# Patient Record
Sex: MALE | Race: WHITE | URBAN - NONMETROPOLITAN AREA
[De-identification: names, ages, dates, MRNs, and addresses within clinical notes are randomized per-mention and may not be internally consistent; named-entity substitution may affect disease eponyms.]

---

## 2018-02-11 ENCOUNTER — HOSPITAL ENCOUNTER (EMERGENCY)
Facility: OTHER | Age: 83
Discharge: HOME | End: 2018-02-11
Attending: FAMILY MEDICINE | Admitting: FAMILY MEDICINE
Payer: MEDICARE

## 2018-02-11 VITALS
HEIGHT: 70 IN | DIASTOLIC BLOOD PRESSURE: 103 MMHG | RESPIRATION RATE: 14 BRPM | TEMPERATURE: 96.5 F | WEIGHT: 160 LBS | SYSTOLIC BLOOD PRESSURE: 176 MMHG | OXYGEN SATURATION: 96 % | HEART RATE: 74 BPM | BODY MASS INDEX: 22.9 KG/M2

## 2018-02-11 DIAGNOSIS — M62.81 MUSCLE WEAKNESS (GENERALIZED): ICD-10-CM

## 2018-02-11 LAB
ANION GAP SERPL CALCULATED.3IONS-SCNC: 8 MMOL/L (ref 3–14)
BASOPHILS # BLD AUTO: 0 10E9/L (ref 0–0.2)
BASOPHILS NFR BLD AUTO: 0.5 %
BUN SERPL-MCNC: 12 MG/DL (ref 7–25)
CALCIUM SERPL-MCNC: 9.2 MG/DL (ref 8.6–10.3)
CHLORIDE SERPL-SCNC: 102 MMOL/L (ref 98–107)
CO2 SERPL-SCNC: 28 MMOL/L (ref 21–31)
CREAT SERPL-MCNC: 1.08 MG/DL (ref 0.7–1.3)
DIFFERENTIAL METHOD BLD: ABNORMAL
EOSINOPHIL # BLD AUTO: 0.2 10E9/L (ref 0–0.7)
EOSINOPHIL NFR BLD AUTO: 2.7 %
ERYTHROCYTE [DISTWIDTH] IN BLOOD BY AUTOMATED COUNT: 12.1 % (ref 10–15)
GFR SERPL CREATININE-BSD FRML MDRD: 65 ML/MIN/1.7M2
GLUCOSE SERPL-MCNC: 106 MG/DL (ref 70–105)
HCT VFR BLD AUTO: 44.4 % (ref 40–53)
HGB BLD-MCNC: 15.6 G/DL (ref 13.3–17.7)
IMM GRANULOCYTES # BLD: 0 10E9/L (ref 0–0.4)
IMM GRANULOCYTES NFR BLD: 0.3 %
LYMPHOCYTES # BLD AUTO: 1.3 10E9/L (ref 0.8–5.3)
LYMPHOCYTES NFR BLD AUTO: 16.1 %
MCH RBC QN AUTO: 33.3 PG (ref 26.5–33)
MCHC RBC AUTO-ENTMCNC: 35.1 G/DL (ref 31.5–36.5)
MCV RBC AUTO: 95 FL (ref 78–100)
MONOCYTES # BLD AUTO: 1 10E9/L (ref 0–1.3)
MONOCYTES NFR BLD AUTO: 13.2 %
NEUTROPHILS # BLD AUTO: 5.3 10E9/L (ref 1.6–8.3)
NEUTROPHILS NFR BLD AUTO: 67.2 %
PLATELET # BLD AUTO: 288 10E9/L (ref 150–450)
POTASSIUM SERPL-SCNC: 4 MMOL/L (ref 3.5–5.1)
RBC # BLD AUTO: 4.68 10E12/L (ref 4.4–5.9)
SODIUM SERPL-SCNC: 138 MMOL/L (ref 134–144)
WBC # BLD AUTO: 7.9 10E9/L (ref 4–11)

## 2018-02-11 PROCEDURE — 80048 BASIC METABOLIC PNL TOTAL CA: CPT | Performed by: EMERGENCY MEDICINE

## 2018-02-11 PROCEDURE — 99283 EMERGENCY DEPT VISIT LOW MDM: CPT | Mod: Z6 | Performed by: FAMILY MEDICINE

## 2018-02-11 PROCEDURE — 36415 COLL VENOUS BLD VENIPUNCTURE: CPT | Performed by: EMERGENCY MEDICINE

## 2018-02-11 PROCEDURE — 93010 ELECTROCARDIOGRAM REPORT: CPT | Performed by: INTERNAL MEDICINE

## 2018-02-11 PROCEDURE — 93005 ELECTROCARDIOGRAM TRACING: CPT | Performed by: FAMILY MEDICINE

## 2018-02-11 PROCEDURE — 85025 COMPLETE CBC W/AUTO DIFF WBC: CPT | Performed by: EMERGENCY MEDICINE

## 2018-02-11 PROCEDURE — 99284 EMERGENCY DEPT VISIT MOD MDM: CPT | Mod: 25 | Performed by: FAMILY MEDICINE

## 2018-02-11 RX ORDER — FLUVASTATIN SODIUM 80 MG/1
80 TABLET, FILM COATED, EXTENDED RELEASE ORAL
COMMUNITY
Start: 2017-05-31

## 2018-02-11 RX ORDER — MULTIPLE VITAMINS W/ MINERALS TAB 9MG-400MCG
TAB ORAL
COMMUNITY
Start: 2008-02-25

## 2018-02-11 RX ORDER — OMEPRAZOLE 40 MG/1
CAPSULE, DELAYED RELEASE ORAL
COMMUNITY
Start: 2017-08-22

## 2018-02-11 RX ORDER — HYDROCHLOROTHIAZIDE 25 MG/1
TABLET ORAL
COMMUNITY
Start: 2017-05-09

## 2018-02-11 RX ORDER — ACETAMINOPHEN 325 MG/1
TABLET ORAL
COMMUNITY
Start: 2010-03-19

## 2018-02-11 RX ORDER — LISINOPRIL 20 MG/1
20 TABLET ORAL
COMMUNITY
Start: 2017-05-31

## 2018-02-11 RX ORDER — FAMOTIDINE 20 MG/1
TABLET, FILM COATED ORAL
COMMUNITY
Start: 2010-03-19

## 2018-02-11 NOTE — ED AVS SNAPSHOT
[facility]    [address]    [place] [address]    Phone:  [phone]    Fax:  [phone]                                       Alvarez Dorsey   MRN: [id number]    Department:  [facility] [place] [facility]   Date of Visit:  2/11/2018           After Visit Summary Signature Page     I have received my discharge instructions, and my questions have been answered. I have discussed any challenges I see with this plan with the nurse or doctor.    ..........................................................................................................................................  Patient/Patient Representative Signature      ..........................................................................................................................................  Patient Representative Print Name and Relationship to Patient    ..................................................               ................................................  Date                                            Time    ..........................................................................................................................................  Reviewed by Signature/Title    ...................................................              ..............................................  Date                                                            Time

## 2018-02-11 NOTE — ED AVS SNAPSHOT
[facility]    [address]    [place] [address]    Phone:  [phone]    Fax:  [phone]                                       Alvarez Dorsey   MRN: [id number]    Department:  [facility]   Date of Visit:  2/11/2018           Patient Information     Date Of Birth          1935        Your diagnoses for this visit were:     Muscle weakness (generalized)        You were seen by Donal Buchanan MD.      Follow-up Information     Follow up with [facility].    Specialty:  EMERGENCY MEDICINE    Why:  If symptoms worsen    Contact information:    [address]  [place] [address] [phone]        Discharge Instructions         Weakness (Uncertain Cause)  Based on your exam today, the exact cause of your weakness is not certain. However, your weakness does not seem to be a sign of a serious illness at this time. Keep an eye on your symptoms and get medical advice as instructed below.  Home care    Rest at home today. Do not over-exert yourself.    Take any medicine as prescribed.    For the next few days, drink extra fluids (unless your healthcare provider wants you to restrict fluids for other reasons). Do not skip meals.  Follow-up care  Follow up with your healthcare provider or as advised.  When to seek medical advice  Call your healthcare provider for any of the following    Worsening of your symptoms    Symptoms don't start getting better within 2 days    Fever of 100.4  F (38  C) or higher, or as directed by your healthcare provider     Call 911  Get emergency medical care for any of these:    Chest, arm, neck, jaw or upper back pain    Trouble breathing    Numbness or weakness of the face, one arm or one leg    Slurred speech, confusion, trouble speaking, walking or seeing    Blood in vomit or stool (black or red color)    Loss of consciousness  Date Last Reviewed: 6/10/2015    9886-8889 [employer] [facility]  [email or web address]. [address] [place] [address]. All rights reserved. This information is not intended as a substitute for professional medical care. Always follow your healthcare professional's instructions.          24 Hour Appointment Hotline       To make an appointment at any [facility] clinic, call [phone] ([phone]). If you don't have a family doctor or clinic, we will help you find one. [facility] clinics are conveniently located to serve the needs of you and your family.             Review of your medicines      Our records show that you are taking the medicines listed below. If these are incorrect, please call your family doctor or clinic.        Dose / Directions Last dose taken    famotidine 20 MG tablet   Commonly known as:  PEPCID        Refills:  0        Fluvastatin Sodium 80 MG Tb24   Dose:  80 mg        Take 80 mg by mouth   Refills:  0        hydrochlorothiazide 25 MG tablet   Commonly known as:  HYDRODIURIL        TAKE ONE-HALF TABLET BY MOUTH ONCE DAILY   Refills:  0        lisinopril 20 MG tablet   Commonly known as:  PRINIVIL/ZESTRIL   Dose:  20 mg        Take 20 mg by mouth   Refills:  0        Multi-vitamin Tabs tablet        Refills:  0        omeprazole 40 MG capsule   Commonly known as:  priLOSEC        TAKE ONE CAPSULE BY MOUTH ONCE DAILY BEFORE  A  MEAL.  DO  NOT  CRUSH.   Refills:  0        TYLENOL 325 MG tablet   Generic drug:  acetaminophen        Refills:  0                Procedures and tests performed during your visit     Basic metabolic panel    CBC with platelets differential      Orders Needing Specimen Collection     None      Pending Results     No orders found from 2/9/2018 to 2/12/2018.            Pending Culture Results     No orders found from 2/9/2018 to 2/12/2018.            Thank you for choosing [facility]       Thank you for choosing [facility] for your care. Our goal is always to provide you with excellent care. Hearing back from our patients is one  "way we can continue to improve our services. Please take a few minutes to complete the written survey that you may receive in the mail after you visit with us. Thank you!        [employer] Information     [employer] lets you send messages to your doctor, view your test results, renew your prescriptions, schedule appointments and more. To sign up, go to [email or web address] . Click on \"Log in\" on the left side of the screen, which will take you to the Welcome page. Then click on \"Sign up Now\" on the right side of the page.     You will be asked to enter the access code listed below, as well as some personal information. Please follow the directions to create your username and password.     Your access code is: [email or web address]  Expires: 2018  8:18 PM     Your access code will  in 90 days. If you need help or a new code, please call your [facility] clinic or [phone].        Care EveryWhere ID     This is your Care EveryWhere ID. This could be used by other organizations to access your [facility] medical records  [id number]        Equal Access to Services     MICHEAL ZAVALA : Hadhira Milner, massiel catalan, saurav luna. So [facility] [phone].    ATENCIÓN: Si habla español, tiene a selby disposición servicios gratuitos de asistencia lingüística. Samy al [phone].    We comply with applicable federal civil rights laws and [place] laws. We do not discriminate on the basis of race, color, national origin, age, disability, sex, sexual orientation, or gender identity.            After Visit Summary       This is your record. Keep this with you and show to your community pharmacist(s) and doctor(s) at your next visit.                  "

## 2018-02-12 ASSESSMENT — ENCOUNTER SYMPTOMS
ADENOPATHY: 0
SHORTNESS OF BREATH: 0
CHILLS: 0
CHOKING: 0
DIAPHORESIS: 0
SPEECH DIFFICULTY: 0
TREMORS: 0
FATIGUE: 0
COUGH: 0
POLYDIPSIA: 0
POLYPHAGIA: 0
CHEST TIGHTNESS: 0
FLANK PAIN: 0
LIGHT-HEADEDNESS: 1
FEVER: 0
PHOTOPHOBIA: 0

## 2018-02-12 NOTE — ED PROVIDER NOTES
History     Chief Complaint   Patient presents with     Generalized Weakness     BP high today at home, has not taken his lisinopril for several days       The history is provided by the patient and the spouse.     Alvarez Dorsey is a 82 year old male who presents with generalized weakness worsening over the last month.  Today he was more shaky than normal and they called the sensory nurse line and they were told to come into the ER.  His blood pressure is higher today than it has been but he has not taken his lisinopril in the last few days.  He admits that it is just because of poor compliance.  He has no chest pain or shortness of breath, right now he feels okay.  The symptoms really of disproven been progressing slowly over the last month to 6 weeks as mentioned.  No recent fevers or chills, no flulike symptoms.  No nausea vomiting or diarrhea.    Problem List:    There are no active problems to display for this patient.       Past Medical History:    History reviewed. No pertinent past medical history.    Past Surgical History:    History reviewed. No pertinent surgical history.    Family History:    No family history on file.    Social History:  Marital Status:   [2]  Social History   Substance Use Topics     Smoking status: Never Smoker     Smokeless tobacco: Never Used     Alcohol use No        Medications:      acetaminophen (TYLENOL) 325 MG tablet   famotidine (PEPCID) 20 MG tablet   Fluvastatin Sodium 80 MG TB24   hydrochlorothiazide (HYDRODIURIL) 25 MG tablet   lisinopril (PRINIVIL/ZESTRIL) 20 MG tablet   multivitamin, therapeutic with minerals (MULTI-VITAMIN) TABS tablet   omeprazole (PRILOSEC) 40 MG capsule         Review of Systems   Constitutional: Negative for chills, diaphoresis, fatigue and fever.   HENT: Negative for congestion.    Eyes: Negative for photophobia.   Respiratory: Negative for cough, choking, chest tightness and shortness of breath.    Cardiovascular: Negative for chest pain.  "  Endocrine: Negative for polydipsia, polyphagia and polyuria.   Genitourinary: Negative for enuresis and flank pain.   Neurological: Positive for light-headedness. Negative for tremors, syncope and speech difficulty.   Hematological: Negative for adenopathy.       Physical Exam   BP: (!) 189/112  Pulse: 74  Temp: 96.5  F (35.8  C)  Resp: 18  Height: 177.8 cm (5' 10\")  Weight: 72.6 kg (160 lb)  SpO2: 99 %      Physical Exam   Constitutional: He is oriented to person, place, and time. He appears well-developed and well-nourished.   HENT:   Mouth/Throat: No oropharyngeal exudate.   Eyes: Conjunctivae are normal. Pupils are equal, round, and reactive to light.   Neck: Normal range of motion.   Cardiovascular: Normal rate.    No murmur heard.  Pulmonary/Chest: Effort normal and breath sounds normal. No respiratory distress. He has no rales. He exhibits no tenderness.   Abdominal: Soft. He exhibits no distension. There is no tenderness.   Musculoskeletal: He exhibits no edema.   Neurological: He is alert and oriented to person, place, and time.   Skin: He is not diaphoretic.   Nursing note and vitals reviewed.      ED Course     ED Course   Comment Time   In room with patient now 02/11 1916     Procedures               EKG Interpretation:      Interpreted by Donal Buchanan  Time reviewed:   Symptoms at time of EKG: None   Rhythm: paced  Rate: Normal  Axis: Normal  Ectopy: none  Conduction: normal  ST Segments/ T Waves: No ST-T wave changes  Q Waves: none  Comparison to prior: No old EKG available    Clinical Impression: paced rhythm  Allergies   Allergen Reactions     Penicillins Hives     Years ago     Patient Vitals for the past 24 hrs:   BP Temp Temp src Pulse Resp SpO2 Height Weight   02/11/18 2000 (!) 176/103 - - - 14 96 % - -   02/11/18 1945 (!) 164/93 - - - 11 94 % - -   02/11/18 1930 (!) 156/96 - - - 16 98 % - -   02/11/18 1915 (!) 176/102 - - - 11 95 % - -   02/11/18 1900 (!) 174/92 - - - 9 97 % - -   02/11/18 " "1844 (!) 182/104 - - - 22 97 % - -   02/11/18 1824 (!) 189/112 96.5  F (35.8  C) Tympanic 74 18 99 % 1.778 m (5' 10\") 72.6 kg (160 lb)     Results for orders placed or performed during the hospital encounter of 02/11/18   Basic metabolic panel   Result Value Ref Range    Sodium 138 134 - 144 mmol/L    Potassium 4.0 3.5 - 5.1 mmol/L    Chloride 102 98 - 107 mmol/L    Carbon Dioxide 28 21 - 31 mmol/L    Anion Gap 8 3 - 14 mmol/L    Glucose 106 (H) 70 - 105 mg/dL    Urea Nitrogen 12 7 - 25 mg/dL    Creatinine 1.08 0.70 - 1.30 mg/dL    GFR Estimate 65 >60 mL/min/1.7m2    GFR Estimate If Black 79 >60 mL/min/1.7m2    Calcium 9.2 8.6 - 10.3 mg/dL   CBC with platelets differential   Result Value Ref Range    WBC 7.9 4.0 - 11.0 10e9/L    RBC Count 4.68 4.4 - 5.9 10e12/L    Hemoglobin 15.6 13.3 - 17.7 g/dL    Hematocrit 44.4 40.0 - 53.0 %    MCV 95 78 - 100 fl    MCH 33.3 (H) 26.5 - 33.0 pg    MCHC 35.1 31.5 - 36.5 g/dL    RDW 12.1 10.0 - 15.0 %    Platelet Count 288 150 - 450 10e9/L    Diff Method Automated Method     Absolute Basophils 0.0 0.0 - 0.2 10e9/L    Absolute Eosinophils 0.2 0.0 - 0.7 10e9/L    Abs Immature Granulocytes 0.0 0 - 0.4 10e9/L    Absolute Lymphocytes 1.3 0.8 - 5.3 10e9/L    Absolute Monocytes 1.0 0.0 - 1.3 10e9/L    Absolute Neutrophil 5.3 1.6 - 8.3 10e9/L    % Basophils 0.5 %    % Eosinophils 2.7 %    % Immature Granulocytes 0.3 %    % Lymphocytes 16.1 %    % Monocytes 13.2 %    % Neutrophils 67.2 %     Orders Placed This Encounter   Procedures     Basic metabolic panel     Standing Status:   Standing     Number of Occurrences:   1     CBC with platelets differential     Last Lab Result: No results found for: HGB     Standing Status:   Standing     Number of Occurrences:   1     Medications - No data to display      Labs Ordered and Resulted from Time of ED Arrival Up to the Time of Departure from the ED   BASIC METABOLIC PANEL - Abnormal; Notable for the following:        Result Value    Glucose 106 " (*)     All other components within normal limits   CBC WITH PLATELETS DIFFERENTIAL - Abnormal; Notable for the following:     MCH 33.3 (*)     All other components within normal limits       Assessments & Plan (with Medical Decision Making)     I have reviewed the nursing notes.    I have reviewed the findings, diagnosis, plan and need for follow up with the patient.       New Prescriptions    No medications on file       Final diagnoses:   Muscle weakness (generalized)   Mild generalized weakness, stable over the ER stay.  Likely multifactorial, is subacute in nature.  He ambulated well prior to discharge.  Labs are reassuring, EKG shows functioning pacemaker.  Reassurance provided recommend follow-up with primary care physician.  Return to ER for worsening symptoms.  Patient verbalized understanding of plan and is in agreement.  Elevated blood pressure: Recommended adherence to his lisinopril regimen.  2/11/2018   [facility]     Donal Buchanan MD  02/12/18 0616

## 2018-02-12 NOTE — DISCHARGE INSTRUCTIONS
Weakness (Uncertain Cause)  Based on your exam today, the exact cause of your weakness is not certain. However, your weakness does not seem to be a sign of a serious illness at this time. Keep an eye on your symptoms and get medical advice as instructed below.  Home care    Rest at home today. Do not over-exert yourself.    Take any medicine as prescribed.    For the next few days, drink extra fluids (unless your healthcare provider wants you to restrict fluids for other reasons). Do not skip meals.  Follow-up care  Follow up with your healthcare provider or as advised.  When to seek medical advice  Call your healthcare provider for any of the following    Worsening of your symptoms    Symptoms don't start getting better within 2 days    Fever of 100.4  F (38  C) or higher, or as directed by your healthcare provider     Call 911  Get emergency medical care for any of these:    Chest, arm, neck, jaw or upper back pain    Trouble breathing    Numbness or weakness of the face, one arm or one leg    Slurred speech, confusion, trouble speaking, walking or seeing    Blood in vomit or stool (black or red color)    Loss of consciousness  Date Last Reviewed: 6/10/2015    6182-0857 [employer]. [address] [place] [address], [place] [address]. All rights reserved. This information is not intended as a substitute for professional medical care. Always follow your healthcare professional's instructions.